# Patient Record
Sex: FEMALE | Race: WHITE | URBAN - METROPOLITAN AREA
[De-identification: names, ages, dates, MRNs, and addresses within clinical notes are randomized per-mention and may not be internally consistent; named-entity substitution may affect disease eponyms.]

---

## 2021-06-25 ENCOUNTER — OFFICE VISIT (OUTPATIENT)
Dept: RHEUMATOLOGY | Age: 56
End: 2021-06-25
Payer: MEDICAID

## 2021-06-25 VITALS
TEMPERATURE: 99.5 F | DIASTOLIC BLOOD PRESSURE: 77 MMHG | WEIGHT: 257 LBS | SYSTOLIC BLOOD PRESSURE: 115 MMHG | HEART RATE: 78 BPM

## 2021-06-25 DIAGNOSIS — M25.551 BILATERAL HIP PAIN: ICD-10-CM

## 2021-06-25 DIAGNOSIS — M47.816 LUMBAR FACET ARTHROPATHY: ICD-10-CM

## 2021-06-25 DIAGNOSIS — M25.552 BILATERAL HIP PAIN: ICD-10-CM

## 2021-06-25 DIAGNOSIS — L73.2 HIDRADENITIS: ICD-10-CM

## 2021-06-25 DIAGNOSIS — L40.9 PSORIASIS: Primary | ICD-10-CM

## 2021-06-25 DIAGNOSIS — Z79.899 ONGOING USE OF POSSIBLY TOXIC MEDICATION: ICD-10-CM

## 2021-06-25 PROCEDURE — 99205 OFFICE O/P NEW HI 60 MIN: CPT | Performed by: INTERNAL MEDICINE

## 2021-06-25 RX ORDER — CELECOXIB 100 MG/1
100 CAPSULE ORAL 2 TIMES DAILY
COMMUNITY

## 2021-06-25 RX ORDER — LANOLIN ALCOHOL/MO/W.PET/CERES
1000 CREAM (GRAM) TOPICAL DAILY
COMMUNITY

## 2021-06-25 RX ORDER — LEVOCETIRIZINE DIHYDROCHLORIDE 5 MG/1
TABLET, FILM COATED ORAL
COMMUNITY

## 2021-06-25 RX ORDER — GABAPENTIN 300 MG/1
300 CAPSULE ORAL 3 TIMES DAILY
COMMUNITY

## 2021-06-25 RX ORDER — LOSARTAN POTASSIUM 50 MG/1
TABLET ORAL DAILY
COMMUNITY

## 2021-06-25 RX ORDER — METFORMIN HYDROCHLORIDE 1000 MG/1
1000 TABLET ORAL 2 TIMES DAILY WITH MEALS
COMMUNITY

## 2021-06-25 RX ORDER — ZINC GLUCONATE 50 MG
500 TABLET ORAL
COMMUNITY

## 2021-06-25 RX ORDER — GUAIFENESIN 100 MG/5ML
81 LIQUID (ML) ORAL DAILY
COMMUNITY

## 2021-06-25 RX ORDER — GLIPIZIDE 5 MG/1
TABLET ORAL
COMMUNITY

## 2021-06-25 RX ORDER — DULOXETIN HYDROCHLORIDE 60 MG/1
60 CAPSULE, DELAYED RELEASE ORAL DAILY
COMMUNITY

## 2021-06-25 NOTE — PROGRESS NOTES
REASON FOR VISIT:   Vanessa Rincon is a 64 y.o. female with history of pilonodal cyst and hidradenitis who is self-referred to [address] [place] [address], regarding a diagnosis of joint pain. HISTORY OF PRESENT ILLNESS                                   Now, both hips and left > right shoulder are primary joint pains. Worse with use. Under an hour now of joint pain. Has been taking Celebrex, duloxetine daily helps. She had lesion on left buttocks years ago, one lesion took 3 years to heal after lancing. Now starting to get more lesions in inguinal region. Not currently applying topical antibiotics, has had limited success in past with these. Psoriasis rash has responded well in the past to calcipotriene cream. Hasn't taken methotrexate previously. No overt joint warmth or swelling. No abrupt flaring character. No inflammatory back paiin. Denies eye redness or pain. No rash on palms or soles. REVIEW OF SYSTEMS  A comprehensive review of systems was negative except for that written in the HPI. A 10-point review of systems is per the new patient questionnaire, which has been reviewed extensively and scanned into the electronic medical record for future reference. Review of systems is as above and is otherwise negative. ALLERGIES  Patient has no allergy information on record. MEDICATIONS  Current Outpatient Medications   Medication Sig    metFORMIN (GLUCOPHAGE) 1,000 mg tablet Take 1,000 mg by mouth two (2) times daily (with meals).  glipiZIDE (GLUCOTROL) 5 mg tablet Take  by mouth once over twenty-four (24) hours.  levocetirizine (Xyzal) 5 mg tablet Take  by mouth.  DULoxetine (CYMBALTA) 60 mg capsule Take 60 mg by mouth daily.  celecoxib (CeleBREX) 100 mg capsule Take 100 mg by mouth two (2) times a day.  gabapentin (NEURONTIN) 300 mg capsule Take 300 mg by mouth three (3) times daily.  aspirin 81 mg chewable tablet Take 81 mg by mouth daily.     cyanocobalamin 1,000 mcg tablet Take 1,000 mcg by mouth daily.  losartan (COZAAR) 50 mg tablet Take  by mouth daily.  tumeric-ging-olive-oreg-capryl 100 mg-150 mg- 50 mg-150 mg cap Take  by mouth.  Magnesium Oxide 500 mg cap Take 500 mg by mouth. No current facility-administered medications for this visit. PAST MEDICAL HISTORY  No past medical history on file. FAMILY HISTORY  Mother with hidradenitis. Younger sister recently sustained MI.     SOCIAL HISTORY  She    Social History     Social History Narrative    Not on file       DATA  Visit Vitals  /77   Pulse 78   Temp 99.5 °F (37.5 °C)   Wt 257 lb (116.6 kg)                                                                                                                                                                                                   There is no height or weight on file to calculate BMI. No flowsheet data found. General:  The patient is pleasant, obese, alert, and in no apparent distress. Eyes: Sclera are anicteric. No conjunctival injection. HEENT:  Oropharynx is clear. No oral ulcers. Adequate salivary pooling. No cervical or supraclavicular lymphadenopathy. Lungs:  Clear to auscultation bilaterally, without wheeze or stridor. Normal respiratory effort. Cor:  Regular rate and rhythm. No murmur rub or gallop. Abdomen: Soft, non-tender, without hepatomegaly or masses. Extremities: No calf tenderness or edema. Warm and well perfused. Skin:  Postinflammatory hypopigmentation under right axilla, papular acneiform rash under breasts without fluctuance or drainage. Faded plaques over most of bilateral forearms. Longitudinal ridging of nails, no pitting. Neuro: Nonfocal, normal unassisted gait. Musculoskeletal:    A comprehensive musculoskeletal exam was performed for all joints of each upper and lower extremity and assessed for swelling, tenderness and range of motion. Results are documented as below:  Paralumbar tenderness bilaterally. No direct SI tenderness. No evidence of synovitis in the small joints of the hands, wrists, shoulders, elbows, hips, knees or ankles. Labs:  Most recent labs from 2018 in [address]:   4/5/18 UA WNL, microalbumine undetectable; TSH WNL;   2/16/16: CMP WNL (Cr 0.81), CBC WNL    Imaging:  None relevant      ASSESSMENT AND PLAN  Ms. Edwin Chatterjee is a 64 y.o. female who presents for evaluation of psoriatic plaques, hidradenitis suppurativa, and history of inflammatory response to surgical drainage suspicious for pyoderma. Currently, hip and low back pains seem more degenerative in character, but screening with initial plain films and favor starting with methotrexate for hidradenitis and plaque psoriasis if baseline labs are normal.       1. Psoriasis  - Start methotrexate one verify depe organ fucntion intact. - XR SPINE LUMB MIN 4 V; Future  - XR HIPS BI W PELV 3 OR 4 VWS; Future  - C REACTIVE PROTEIN, QT; Future  - CBC WITH AUTOMATED DIFF; Future  - METABOLIC PANEL, COMPREHENSIVE; Future  - SED RATE (ESR); Future  - CHRONIC HEPATITIS PANEL; Future  - QUANTIFERON-TB PLUS(CLIENT INCUB.); Future    2. Hidradenitis  - C REACTIVE PROTEIN, QT; Future  - CBC WITH AUTOMATED DIFF; Future  - METABOLIC PANEL, COMPREHENSIVE; Future  - CHRONIC HEPATITIS PANEL; Future    3. Lumbar facet arthropathy  - XR SPINE LUMB MIN 4 V; Future    4. Bilateral hip pain  - XR HIPS BI W PELV 3 OR 4 VWS; Future    5. Ongoing use of possibly toxic medication  - CHRONIC HEPATITIS PANEL; Future  - QUANTIFERON-TB PLUS(CLIENT INCUB.); Future    Patient Instructions   1. 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 These can all respond well to methotrexate, which would also treat most forms of autoimmune arthritis associated with these. I'm ordering baseline labs to make sure this safe to start. . send me a message once you get those labs and once verify them we'll get you started on mehtotrexate. 2. Once you get it, start methotrexate 4 pills once a week and increase to 6 pills once a week if well tolerated. Take folic acid pills once a day to prevent side effects. 3. We'll follow labs once a month when we first start this, and after 3 normal checks we can space it out to once every 3 months. 4. Return in 1 month. Orders Placed This Encounter    XR SPINE LUMB MIN 4 V    XR HIPS BI W PELV 3 OR 4 VWS    C REACTIVE PROTEIN, QT    CBC WITH AUTOMATED DIFF    METABOLIC PANEL, COMPREHENSIVE    SED RATE (ESR)    CHRONIC HEPATITIS PANEL    QUANTIFERON-TB PLUS(CLIENT INCUB.)    metFORMIN (GLUCOPHAGE) 1,000 mg tablet    glipiZIDE (GLUCOTROL) 5 mg tablet    levocetirizine (Xyzal) 5 mg tablet    DULoxetine (CYMBALTA) 60 mg capsule    celecoxib (CeleBREX) 100 mg capsule    gabapentin (NEURONTIN) 300 mg capsule    aspirin 81 mg chewable tablet    cyanocobalamin 1,000 mcg tablet    losartan (COZAAR) 50 mg tablet    tumeric-ging-olive-oreg-capryl 100 mg-150 mg- 50 mg-150 mg cap    Magnesium Oxide 500 mg cap       Medications: I am having Kary Montiel. Hellen Paula maintain her metFORMIN, glipiZIDE, levocetirizine, DULoxetine, celecoxib, gabapentin, aspirin, cyanocobalamin, losartan, tumeric-ging-olive-oreg-capryl, and Magnesium Oxide. Follow up: Return in about 4 weeks (around 7/23/2021).     Face to face time: 50 minutes  Note preparation and records review day of service: 20 minutes  Total provider time day of service: 70 minutes    Dinesh Arguello MD    Adult Rheumatology   [address], [address]   Phone [phone]  Fax [phone]

## 2021-06-25 NOTE — PATIENT INSTRUCTIONS
1. 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 These can all respond well to methotrexate, which would also treat most forms of autoimmune arthritis associated with these. I'm ordering baseline labs to make sure this safe to start. . send me a message once you get those labs and once verify them we'll get you started on mehtotrexate. 2. Once you get it, start methotrexate 4 pills once a week and increase to 6 pills once a week if well tolerated. Take folic acid pills once a day to prevent side effects. 3. We'll follow labs once a month when we first start this, and after 3 normal checks we can space it out to once every 3 months. 4. Return in 1 month.

## 2021-11-11 ENCOUNTER — OFFICE VISIT (OUTPATIENT)
Dept: RHEUMATOLOGY | Age: 56
End: 2021-11-11
Payer: MEDICAID

## 2021-11-11 VITALS
TEMPERATURE: 98.2 F | SYSTOLIC BLOOD PRESSURE: 133 MMHG | DIASTOLIC BLOOD PRESSURE: 80 MMHG | RESPIRATION RATE: 16 BRPM | HEART RATE: 92 BPM | OXYGEN SATURATION: 96 % | WEIGHT: 248 LBS

## 2021-11-11 DIAGNOSIS — Z79.899 ONGOING USE OF POSSIBLY TOXIC MEDICATION: ICD-10-CM

## 2021-11-11 DIAGNOSIS — L40.50 PSORIATIC ARTHRITIS (HCC): Primary | ICD-10-CM

## 2021-11-11 DIAGNOSIS — M47.816 LUMBAR FACET ARTHROPATHY: ICD-10-CM

## 2021-11-11 PROCEDURE — 99214 OFFICE O/P EST MOD 30 MIN: CPT | Performed by: INTERNAL MEDICINE

## 2021-11-11 RX ORDER — LEVOTHYROXINE SODIUM 150 UG/1
150 TABLET ORAL DAILY
COMMUNITY
Start: 2021-10-19

## 2021-11-11 RX ORDER — PRAVASTATIN SODIUM 40 MG/1
40 TABLET ORAL DAILY
COMMUNITY
Start: 2021-10-18

## 2021-11-11 RX ORDER — GLIPIZIDE 10 MG/1
TABLET, FILM COATED, EXTENDED RELEASE ORAL
COMMUNITY
Start: 2021-10-25

## 2021-11-11 RX ORDER — PANTOPRAZOLE SODIUM 40 MG/1
40 TABLET, DELAYED RELEASE ORAL DAILY
COMMUNITY
Start: 2021-09-07

## 2021-11-11 NOTE — PATIENT INSTRUCTIONS
1. Labs today    2. Once we verify those are normal (sign up for Mychart!), then we'll start the methotrexate 15mg weekly with daily folic acid. If the labs are abnormal, we may need to instead treat you with [place] (apremilast)--we'll discuss further. 3. Repeat labs 1 month after starting methotrexate. 4. Return in 2 months.

## 2021-11-11 NOTE — PROGRESS NOTES
Chief Complaint   Patient presents with    Joint Pain   1. Have you been to the ER, urgent care clinic since your last visit? Hospitalized since your last visit? No    2. Have you seen or consulted any other health care providers outside of the [address] [place] [address] since your last visit? Include any pap smears or colon screening.  No

## 2021-11-11 NOTE — PROGRESS NOTES
REASON FOR VISIT:   Edilia Briseno is a 64 y.o. female with history of pilonodal cyst and hidradenitis who is returning for followup of psoriatic arthritis. HISTORY OF PRESENT ILLNESS        Hasn't had a chance to do labs ordered last visit. Has been doing a lot of moving, down nearly 10 pounds since summer. PT has helped in the past, has been busy with her 's health issues. Still an hour of morning stiffness. Pain mostly in shoulders, hips. REVIEW OF SYSTEMS  A comprehensive review of systems was negative except for that written in the HPI. A 10-point review of systems is per the new patient questionnaire, which has been reviewed extensively and scanned into the electronic medical record for future reference. Review of systems is as above and is otherwise negative. ALLERGIES  Augmentin [amoxicillin-pot clavulanate] and Codeine    MEDICATIONS  Current Outpatient Medications   Medication Sig    levothyroxine (SYNTHROID) 150 mcg tablet Take 150 mcg by mouth daily.  pantoprazole (PROTONIX) 40 mg tablet Take 40 mg by mouth daily.  pravastatin (PRAVACHOL) 40 mg tablet Take 40 mg by mouth daily.  glipiZIDE SR (GLUCOTROL XL) 10 mg CR tablet TAKE ONE TABLET BY MOUTH EVERY DAY WITH BREAKFAST    SITagliptin (Januvia) 50 mg tablet Take 50 mg by mouth daily.  metFORMIN (GLUCOPHAGE) 1,000 mg tablet Take 1,000 mg by mouth two (2) times daily (with meals).  levocetirizine (Xyzal) 5 mg tablet Take  by mouth.  DULoxetine (CYMBALTA) 60 mg capsule Take 60 mg by mouth daily.  celecoxib (CeleBREX) 100 mg capsule Take 100 mg by mouth two (2) times a day.  aspirin 81 mg chewable tablet Take 81 mg by mouth daily.  cyanocobalamin 1,000 mcg tablet Take 1,000 mcg by mouth daily.  losartan (COZAAR) 50 mg tablet Take  by mouth daily.  tumeric-ging-olive-oreg-capryl 100 mg-150 mg- 50 mg-150 mg cap Take  by mouth.     Magnesium Oxide 500 mg cap Take 500 mg by mouth.    glipiZIDE (GLUCOTROL) 5 mg tablet Take  by mouth once over twenty-four (24) hours. (Patient not taking: Reported on 11/11/2021)    gabapentin (NEURONTIN) 300 mg capsule Take 300 mg by mouth three (3) times daily. (Patient not taking: Reported on 11/11/2021)     No current facility-administered medications for this visit. PAST MEDICAL HISTORY  History reviewed. No pertinent past medical history. FAMILY HISTORY  Mother with hidradenitis. Younger sister recently sustained MI.     SOCIAL HISTORY  She  reports that she has never smoked. She has never used smokeless tobacco. She reports that she does not drink alcohol and does not use drugs. Social History     Social History Narrative    Not on file       DATA  Visit Vitals  /80 (BP 1 Location: Left upper arm, BP Patient Position: Sitting, BP Cuff Size: Adult)   Pulse 92   Temp 98.2 °F (36.8 °C) (Oral)   Resp 16   Wt 248 lb (112.5 kg)   SpO2 96%                                                                                                                                                                                                   There is no height or weight on file to calculate BMI. No flowsheet data found. General:  The patient is pleasant, obese, alert, and in no apparent distress. Eyes: Sclera are anicteric. No conjunctival injection. HEENT:  Oropharynx is clear. No oral ulcers. Adequate salivary pooling. No cervical or supraclavicular lymphadenopathy. Lungs:  Clear to auscultation bilaterally, without wheeze or stridor. Normal respiratory effort. Cor:  Regular rate and rhythm. No murmur rub or gallop. Abdomen: Soft, non-tender, without hepatomegaly or masses. Extremities: No calf tenderness or edema. Warm and well perfused. Skin:  Postinflammatory hypopigmentation under right axilla not reexamined. Faded plaques over most of bilateral forearms. Longitudinal ridging of nails, no pitting.   Neuro: Nonfocal, normal unassisted gait. Musculoskeletal:    A comprehensive musculoskeletal exam was performed for all joints of each upper and lower extremity and assessed for swelling, tenderness and range of motion. Results are documented as below:  Paralumbar tenderness bilaterally. No direct SI tenderness. No evidence of synovitis in the small joints of the hands, wrists, shoulders, elbows, hips, knees or ankles. Labs:  Most recent labs from 2018 in [address]:   4/5/18 UA WNL, microalbumine undetectable; TSH WNL;   2/16/16: CMP WNL (Cr 0.81), CBC WNL    Imaging:  None relevant      ASSESSMENT AND PLAN  Ms. Joahna Grayson is a 64 y.o. female who presents for evaluation of psoriatic plaques, hidradenitis suppurativa, and history of inflammatory response to surgical drainage suspicious for pyoderma. Sending hepatitis panel and baseline deep organ function labs, will start on methotrexate if normal for plaque psoriasis and hidradenitis. 1. Psoriatic arthritis ([facility] [address] plaque psoriasis, hidradenitis  - REFERRAL TO PHYSICAL THERAPY  - C REACTIVE PROTEIN, QT; Future  - CBC WITH AUTOMATED DIFF; Future  - METABOLIC PANEL, COMPREHENSIVE; Future  - SED RATE (ESR); Future    2. Lumbar facet arthropathy  - REFERRAL TO PHYSICAL THERAPY    3. Ongoing use of possibly toxic medication  - CBC WITH AUTOMATED DIFF; Future  - METABOLIC PANEL, COMPREHENSIVE; Future      Patient Instructions   1. Labs today    2. Once we verify those are normal (sign up for Mychart!), then we'll start the methotrexate 15mg weekly with daily folic acid. If the labs are abnormal, we may need to instead treat you with [employer] (apremilast)--we'll discuss further. 3. Repeat labs 1 month after starting methotrexate. 4. Return in 2 months.         Orders Placed This Encounter    C REACTIVE PROTEIN, QT    CBC WITH AUTOMATED DIFF    METABOLIC PANEL, COMPREHENSIVE    SED RATE (ESR)    QUANTIFERON-TB PLUS(CLIENT INCUB.)    REFERRAL TO PHYSICAL THERAPY    levothyroxine (SYNTHROID) 150 mcg tablet    pantoprazole (PROTONIX) 40 mg tablet    pravastatin (PRAVACHOL) 40 mg tablet    glipiZIDE SR (GLUCOTROL XL) 10 mg CR tablet    SITagliptin (Januvia) 50 mg tablet       Medications: I am having Laura [place]. Garett Canada maintain her metFORMIN, glipiZIDE, levocetirizine, DULoxetine, celecoxib, gabapentin, aspirin, cyanocobalamin, losartan, tumeric-ging-olive-oreg-capryl, Magnesium Oxide, levothyroxine, pantoprazole, pravastatin, glipiZIDE SR, and SITagliptin. Follow up: Return in about 2 months (around 1/11/2022).     Face to face time: 20 minutes  Note preparation and records review day of service: 15 minutes  Total provider time day of service: 35 minutes    Mireya Solis MD    Adult Rheumatology   [address]   Phone [phone]  Fax [phone]

## 2021-11-13 LAB
GAMMA INTERFERON BACKGROUND BLD IA-ACNC: 0.08 IU/ML
M TB IFN-G BLD-IMP: NEGATIVE
M TB IFN-G CD4+ BCKGRND COR BLD-ACNC: 0.02 IU/ML
MITOGEN IGNF BLD-ACNC: >10 IU/ML
QUANTIFERON TB2 AG: 0.04 IU/ML
SERVICE CMNT-IMP: NORMAL

## 2021-11-17 LAB
HBV CORE AB SERPL QL IA: NEGATIVE
HBV CORE IGM SERPL QL IA: NEGATIVE
HBV E AB SERPL QL IA: NEGATIVE
HBV E AG SERPL QL IA: NEGATIVE
HBV SURFACE AB SER QL: NON REACTIVE
HBV SURFACE AG SERPL QL IA: NEGATIVE
HCV AB S/CO SERPL IA: <0.1 S/CO RATIO (ref 0–0.9)
SPECIMEN STATUS REPORT, ROLRST: NORMAL

## 2021-11-23 LAB
ALBUMIN SERPL-MCNC: 4.4 G/DL (ref 3.8–4.9)
ALBUMIN/GLOB SERPL: 1.4 {RATIO} (ref 1.2–2.2)
ALP SERPL-CCNC: 78 IU/L (ref 44–121)
ALT SERPL-CCNC: 31 IU/L (ref 0–32)
AST SERPL-CCNC: 24 IU/L (ref 0–40)
BASOPHILS # BLD AUTO: 0.1 X10E3/UL (ref 0–0.2)
BASOPHILS NFR BLD AUTO: 1 %
BILIRUB SERPL-MCNC: <0.2 MG/DL (ref 0–1.2)
BUN SERPL-MCNC: 13 MG/DL (ref 6–24)
BUN/CREAT SERPL: 15 (ref 9–23)
CALCIUM SERPL-MCNC: 9.8 MG/DL (ref 8.7–10.2)
CHLORIDE SERPL-SCNC: 101 MMOL/L (ref 96–106)
CO2 SERPL-SCNC: 25 MMOL/L (ref 20–29)
CREAT SERPL-MCNC: 0.85 MG/DL (ref 0.57–1)
CRP SERPL-MCNC: 9 MG/L (ref 0–10)
EOSINOPHIL # BLD AUTO: 0.3 X10E3/UL (ref 0–0.4)
EOSINOPHIL NFR BLD AUTO: 3 %
ERYTHROCYTE [DISTWIDTH] IN BLOOD BY AUTOMATED COUNT: 13.1 % (ref 11.7–15.4)
ERYTHROCYTE [SEDIMENTATION RATE] IN BLOOD BY WESTERGREN METHOD: 51 MM/HR (ref 0–40)
GLOBULIN SER CALC-MCNC: 3.1 G/DL (ref 1.5–4.5)
GLUCOSE SERPL-MCNC: 176 MG/DL (ref 65–99)
HCT VFR BLD AUTO: 37.1 % (ref 34–46.6)
HGB BLD-MCNC: 12.3 G/DL (ref 11.1–15.9)
IMM GRANULOCYTES # BLD AUTO: 0 X10E3/UL (ref 0–0.1)
IMM GRANULOCYTES NFR BLD AUTO: 0 %
LYMPHOCYTES # BLD AUTO: 2.6 X10E3/UL (ref 0.7–3.1)
LYMPHOCYTES NFR BLD AUTO: 27 %
MCH RBC QN AUTO: 28.4 PG (ref 26.6–33)
MCHC RBC AUTO-ENTMCNC: 33.2 G/DL (ref 31.5–35.7)
MCV RBC AUTO: 86 FL (ref 79–97)
MONOCYTES # BLD AUTO: 0.8 X10E3/UL (ref 0.1–0.9)
MONOCYTES NFR BLD AUTO: 8 %
NEUTROPHILS # BLD AUTO: 5.8 X10E3/UL (ref 1.4–7)
NEUTROPHILS NFR BLD AUTO: 61 %
PLATELET # BLD AUTO: 318 X10E3/UL (ref 150–450)
POTASSIUM SERPL-SCNC: 4.5 MMOL/L (ref 3.5–5.2)
PROT SERPL-MCNC: 7.5 G/DL (ref 6–8.5)
RBC # BLD AUTO: 4.33 X10E6/UL (ref 3.77–5.28)
SODIUM SERPL-SCNC: 141 MMOL/L (ref 134–144)
WBC # BLD AUTO: 9.6 X10E3/UL (ref 3.4–10.8)

## 2022-08-02 ENCOUNTER — TELEPHONE (OUTPATIENT)
Dept: RHEUMATOLOGY | Age: 57
End: 2022-08-02

## 2022-08-02 NOTE — TELEPHONE ENCOUNTER
Patient called and would like her most recent labs faxed to her pcp Dr. Mayra Blackwell at [phone]